# Patient Record
Sex: MALE | Race: WHITE | NOT HISPANIC OR LATINO | URBAN - METROPOLITAN AREA
[De-identification: names, ages, dates, MRNs, and addresses within clinical notes are randomized per-mention and may not be internally consistent; named-entity substitution may affect disease eponyms.]

---

## 2018-04-03 ENCOUNTER — OUTPATIENT (OUTPATIENT)
Dept: OUTPATIENT SERVICES | Facility: HOSPITAL | Age: 73
LOS: 1 days | Discharge: HOME | End: 2018-04-03

## 2018-04-03 DIAGNOSIS — Z01.810 ENCOUNTER FOR PREPROCEDURAL CARDIOVASCULAR EXAMINATION: ICD-10-CM

## 2018-04-03 DIAGNOSIS — I10 ESSENTIAL (PRIMARY) HYPERTENSION: ICD-10-CM

## 2020-01-28 PROBLEM — Z00.00 ENCOUNTER FOR PREVENTIVE HEALTH EXAMINATION: Status: ACTIVE | Noted: 2020-01-28

## 2024-05-24 ENCOUNTER — APPOINTMENT (OUTPATIENT)
Dept: ORTHOPEDIC SURGERY | Facility: CLINIC | Age: 79
End: 2024-05-24

## 2024-09-27 NOTE — ASU PATIENT PROFILE, ADULT - NSICDXPASTMEDICALHX_GEN_ALL_CORE_FT
PAST MEDICAL HISTORY:  GERD (gastroesophageal reflux disease)     History of Morales's esophagus     Hypertension

## 2024-09-27 NOTE — ASU PATIENT PROFILE, ADULT - HISTORY OF COVID-19 VACCINATION
[facility])        [address] [facility] Orthopedic Surgery Call in 1 day For follow-up within 2-3 days  [address] [id number] [facility], [address] [address] [place], [place], [address] [facility] Emergency Department Emergency Medicine Go to  Immediately for any new or worsening symptoms  [address] [facility] [id number]  [address] [place] [address] ED, [address] [place], [place], [address]          Krista Gill DO  07/21/19 1359 Yes

## 2024-09-30 ENCOUNTER — OUTPATIENT (OUTPATIENT)
Dept: OUTPATIENT SERVICES | Facility: HOSPITAL | Age: 79
LOS: 1 days | Discharge: HOME | End: 2024-09-30

## 2024-09-30 VITALS
SYSTOLIC BLOOD PRESSURE: 149 MMHG | DIASTOLIC BLOOD PRESSURE: 85 MMHG | OXYGEN SATURATION: 95 % | TEMPERATURE: 97 F | RESPIRATION RATE: 16 BRPM | HEART RATE: 61 BPM

## 2024-09-30 VITALS
TEMPERATURE: 98 F | HEIGHT: 68 IN | SYSTOLIC BLOOD PRESSURE: 154 MMHG | OXYGEN SATURATION: 97 % | RESPIRATION RATE: 16 BRPM | HEART RATE: 61 BPM | DIASTOLIC BLOOD PRESSURE: 74 MMHG | WEIGHT: 209.44 LBS

## 2024-09-30 DIAGNOSIS — Z96.652 PRESENCE OF LEFT ARTIFICIAL KNEE JOINT: Chronic | ICD-10-CM

## 2024-09-30 DIAGNOSIS — Z98.890 OTHER SPECIFIED POSTPROCEDURAL STATES: Chronic | ICD-10-CM

## 2024-09-30 DIAGNOSIS — Z90.3 ACQUIRED ABSENCE OF STOMACH [PART OF]: Chronic | ICD-10-CM

## 2024-09-30 DEVICE — COMP SCLERAL BUCKLE NO 41: Type: IMPLANTABLE DEVICE | Site: RIGHT | Status: FUNCTIONAL

## 2024-09-30 DEVICE — IMP SIL SLEEVE STYLE 70: Type: IMPLANTABLE DEVICE | Site: RIGHT | Status: FUNCTIONAL

## 2024-09-30 DEVICE — LASER PROBE 25G CONSTELLATION: Type: IMPLANTABLE DEVICE | Site: RIGHT | Status: FUNCTIONAL

## 2024-09-30 DEVICE — PERFLUORON 7ML KIT: Type: IMPLANTABLE DEVICE | Site: RIGHT | Status: FUNCTIONAL

## 2024-09-30 RX ORDER — PHENYLEPHRINE HYDROCHLORIDE 100 MG/ML
1 SOLUTION/ DROPS OPHTHALMIC
Refills: 0 | Status: COMPLETED | OUTPATIENT
Start: 2024-09-30 | End: 2024-09-30

## 2024-09-30 RX ORDER — TIZANIDINE HYDROCHLORIDE 6 MG/1
2 CAPSULE ORAL
Refills: 0 | DISCHARGE

## 2024-09-30 RX ORDER — DILTIAZEM HCL 300 MG
0 CAPSULE, EXTENDED RELEASE 24HR ORAL
Refills: 0 | DISCHARGE

## 2024-09-30 RX ORDER — DILTIAZEM HCL 300 MG
1 CAPSULE, EXTENDED RELEASE 24HR ORAL
Refills: 0 | DISCHARGE

## 2024-09-30 RX ORDER — CELECOXIB 200 MG/1
1 CAPSULE ORAL
Refills: 0 | DISCHARGE

## 2024-09-30 RX ORDER — FENTANYL CITRATE-0.9 % NACL/PF 300MCG/30
25 PATIENT CONTROLLED ANALGESIA VIAL INJECTION
Refills: 0 | Status: DISCONTINUED | OUTPATIENT
Start: 2024-09-30 | End: 2024-09-30

## 2024-09-30 RX ORDER — FAMOTIDINE 40 MG
1 TABLET ORAL
Refills: 0 | DISCHARGE

## 2024-09-30 RX ORDER — ATORVASTATIN CALCIUM 10 MG/1
1 TABLET, FILM COATED ORAL
Refills: 0 | DISCHARGE

## 2024-09-30 RX ORDER — ASPIRIN 325 MG
0 TABLET ORAL
Refills: 0 | DISCHARGE

## 2024-09-30 RX ORDER — SODIUM CHLORIDE IRRIG SOLUTION 0.9 %
1000 SOLUTION, IRRIGATION IRRIGATION
Refills: 0 | Status: DISCONTINUED | OUTPATIENT
Start: 2024-09-30 | End: 2024-09-30

## 2024-09-30 RX ORDER — TAMSULOSIN HCL 0.4 MG
1 CAPSULE ORAL
Refills: 0 | DISCHARGE

## 2024-09-30 RX ORDER — ATROPINE SULFATE 10 MG/ML
1 SOLUTION/ DROPS OPHTHALMIC
Refills: 0 | Status: COMPLETED | OUTPATIENT
Start: 2024-09-30 | End: 2024-09-30

## 2024-09-30 RX ORDER — OFLOXACIN 3 MG/ML
1 SOLUTION/ DROPS OPHTHALMIC
Refills: 0 | Status: COMPLETED | OUTPATIENT
Start: 2024-09-30 | End: 2024-09-30

## 2024-09-30 RX ORDER — TROPICAMIDE 1 %
1 DROPS OPHTHALMIC (EYE)
Refills: 0 | Status: COMPLETED | OUTPATIENT
Start: 2024-09-30 | End: 2024-09-30

## 2024-09-30 RX ADMIN — PHENYLEPHRINE HYDROCHLORIDE 1 DROP(S): 100 SOLUTION/ DROPS OPHTHALMIC at 15:20

## 2024-09-30 RX ADMIN — ATROPINE SULFATE 1 DROP(S): 10 SOLUTION/ DROPS OPHTHALMIC at 15:25

## 2024-09-30 RX ADMIN — Medication 1 DROP(S): at 15:25

## 2024-09-30 RX ADMIN — Medication 1 DROP(S): at 15:15

## 2024-09-30 RX ADMIN — PHENYLEPHRINE HYDROCHLORIDE 1 DROP(S): 100 SOLUTION/ DROPS OPHTHALMIC at 15:25

## 2024-09-30 RX ADMIN — ATROPINE SULFATE 1 DROP(S): 10 SOLUTION/ DROPS OPHTHALMIC at 15:15

## 2024-09-30 RX ADMIN — OFLOXACIN 1 DROP(S): 3 SOLUTION/ DROPS OPHTHALMIC at 15:15

## 2024-09-30 RX ADMIN — ATROPINE SULFATE 1 DROP(S): 10 SOLUTION/ DROPS OPHTHALMIC at 15:20

## 2024-09-30 RX ADMIN — OFLOXACIN 1 DROP(S): 3 SOLUTION/ DROPS OPHTHALMIC at 15:20

## 2024-09-30 RX ADMIN — PHENYLEPHRINE HYDROCHLORIDE 1 DROP(S): 100 SOLUTION/ DROPS OPHTHALMIC at 15:15

## 2024-09-30 RX ADMIN — Medication 1 DROP(S): at 15:20

## 2024-09-30 RX ADMIN — OFLOXACIN 1 DROP(S): 3 SOLUTION/ DROPS OPHTHALMIC at 15:25

## 2024-09-30 NOTE — OPERATIVE REPORT - OPERATIVE RPOSRT DETAILS
VITRECTOMY for a Primary Retinal Detachment    PATIENT NAME: JANIS ALONSO    ATTENDING: Darío Blackmon MD    PREOPERATIVE DIAGNOSIS(ES):  Rhegmatogenous Retinal Detachment, right eye     POSTOPERATIVE DIAGNOSIS(ES): Rhegmatogenous Retinal Detachment, right eye     PROCEDURE: Pars plana vitrectomy, scleral buckle, endolaser, air-fluid exchange, air-gas exchange, all of the right eye     INDICATIONS:       The patient is a 79y year old Male with a history of a retinal detachment in the operative eye. After a detailed review of the risks, benefits and alternatives of the procedure, including but not limited to bleeding, infection, inflammation, retinal detachment, loss of vision, loss of eye, double vision, need for further surgery, and systemic risks of anesthesia and surgery, informed consent was obtained and the patient elected to proceed with the surgery.     PROCEDURE:       On the day of surgery, after clearance by medicine and anesthesia, the patient was brought to the operating room in stable condition. After verifying the correct surgical site, the patient was placed in the supine position. Intravenous sedation was provided by the anesthesia team.  After a brief time-out, a 1:1 mixture of 2% lidocaine and 0.75% Marcaine was injected in a retrobulbar fashion behind the operative eye. The patient was then prepped and draped in the usual sterile fashion.  Eyelashes were draped out of the operative field and a stainless steel eyelid speculum was placed in the operative eye.  A time-out was performed verifying correct patient, procedure, site, positioning and special equipment prior to starting the case.     A 360 degree conjunctival peritomy was created using blunt Phuong scissors and a 0.12 forceps approximately 2 mm posterior to the limbus. A radial relaxing incision was created inferotemporally. Curved Zion’s tenotomy scissors were then used to enter Tenon’s space in each of the exposed quadrants. The intermuscular septa were further dissected. Using a 2-0 silk passed through a Breanna retinal detachment hook, each of the exposed rectus muscles was isolated and tied. The four quadrants were then inspected for signs of scleral thinning.  The 41 band buckle was passed 360 underneath the EOM. A 5-0 nylon mattress suture on a spatulated needle was used to place one-half thickness intrascleral bites in a horizontal mattress fashion to secure the scleral buckle in place in each quadrant. A 70 sleeve was place to secure the buckle and tightened.     A 25-gauge microcannula was placed in the inferotemporal quadrant in a beveled fashion 4 mm posterior to the limbus, as the patient was phakic. The infusion cannula was cleared of air, inserted into the microcannula, confirmed to be in the correct position within the vitreous cavity by direct visualization through the dilated pupil with the light pipe and then turned on under direct visualization. Two additional microcannulas were placed superonasally and superotemporally in a similar fashion. The vitrectomy hand piece and light were then inserted into the eye and the anterior vitreous was cleared under direct visualization.     Then, under indirect wide field visualization, a core and peripheral vitrectomy was performed. The posterior hyaloid was  using active aspiration and Kenalog assistance.  The vitreous was excised 360 degrees to the posterior vitreous base and no additional retinal breaks were noted. Perfluorocarbon liquid was placed posteriorly to stabilize the retina.  The retina was noted to flatten nicely.   Endophotocoagulation was then placed 360 degrees posterior to the buckle.     A backflush was then used to perform an air-fluid exchange, taking care to drain the subretinal fluid from the most posterior break. Following the air-fluid exchange, additional laser was placed on the anterior aspect with endophotocoagulation.       Following the air-fluid exchange, the superonasal microcannula was removed and the sclerotomies were sutured closed using 8-0 vicryl. 11 % C3F8 gas was drawn up by the attending physician and an air-gas exchange was performed. The remaining microcannulas were removed from the eye and the sclerotomies closed using the 8-0 vicryl in an interrupted fashion. All sclerotomies remained airtight and the eye remained at a physiologic pressure by palpation.     Tenon’s capsule was closed over the scleral buckle element using 8-0 vicryl in an interrupted fashion. The overlying conjunctiva was then closed using running and interrupted 8-0 vicryl sutures.  The eye remained at a physiologic pressure by palpation.     Ancef and dex was injected in the subconjunctival space.    The eyelid speculum was removed from the eye. Betadine was cleaned from around the eye. A topical steroid/antibiotic cream was placed on the eye, followed by a patch and a clear plastic shield. The patient tolerated the procedure well and left the operating room in stable condition.

## 2024-09-30 NOTE — OPERATIVE REPORT - NSICDXBRIEFPROCEDURE_GEN_ALL_CORE_FT
PROCEDURES:  Vitrectomy, pars plana approach, with scleral buckling 30-Sep-2024 19:24:41  Reynold Allen

## 2025-02-27 ENCOUNTER — APPOINTMENT (OUTPATIENT)
Age: 80
End: 2025-02-27
Payer: MEDICARE

## 2025-02-27 DIAGNOSIS — B35.1 TINEA UNGUIUM: ICD-10-CM

## 2025-02-27 DIAGNOSIS — L60.2 ONYCHOGRYPHOSIS: ICD-10-CM

## 2025-02-27 DIAGNOSIS — M20.40 OTHER HAMMER TOE(S) (ACQUIRED), UNSPECIFIED FOOT: ICD-10-CM

## 2025-02-27 DIAGNOSIS — M79.674 TINEA UNGUIUM: ICD-10-CM

## 2025-02-27 DIAGNOSIS — M79.675 TINEA UNGUIUM: ICD-10-CM

## 2025-02-27 PROCEDURE — 11721 DEBRIDE NAIL 6 OR MORE: CPT

## 2025-02-27 PROCEDURE — 99213 OFFICE O/P EST LOW 20 MIN: CPT | Mod: 25

## 2025-02-28 PROBLEM — M20.40 ACQUIRED HAMMER TOE: Status: ACTIVE | Noted: 2025-02-28

## 2025-02-28 PROBLEM — L60.2 ONYCHOGRYPHOSIS: Status: ACTIVE | Noted: 2025-02-28

## 2025-02-28 PROBLEM — B35.1 ONYCHOMYCOSIS OF TOENAIL: Status: ACTIVE | Noted: 2025-02-28

## 2025-02-28 PROBLEM — B35.1 PAIN DUE TO ONYCHOMYCOSIS OF TOENAILS OF BOTH FEET: Status: ACTIVE | Noted: 2025-02-28

## 2025-05-08 ENCOUNTER — APPOINTMENT (OUTPATIENT)
Age: 80
End: 2025-05-08
Payer: MEDICARE

## 2025-05-08 DIAGNOSIS — L60.2 ONYCHOGRYPHOSIS: ICD-10-CM

## 2025-05-08 DIAGNOSIS — B35.1 TINEA UNGUIUM: ICD-10-CM

## 2025-05-08 DIAGNOSIS — M79.674 TINEA UNGUIUM: ICD-10-CM

## 2025-05-08 DIAGNOSIS — M79.671 PAIN IN RIGHT FOOT: ICD-10-CM

## 2025-05-08 DIAGNOSIS — M79.672 PAIN IN LEFT FOOT: ICD-10-CM

## 2025-05-08 DIAGNOSIS — M79.675 TINEA UNGUIUM: ICD-10-CM

## 2025-05-08 PROCEDURE — 11721 DEBRIDE NAIL 6 OR MORE: CPT

## 2025-05-08 PROCEDURE — 99213 OFFICE O/P EST LOW 20 MIN: CPT | Mod: 25

## 2025-09-09 ENCOUNTER — APPOINTMENT (OUTPATIENT)
Age: 80
End: 2025-09-09
Payer: MEDICARE

## 2025-09-09 DIAGNOSIS — M79.672 PAIN IN LEFT FOOT: ICD-10-CM

## 2025-09-09 DIAGNOSIS — B35.1 TINEA UNGUIUM: ICD-10-CM

## 2025-09-09 DIAGNOSIS — L60.2 ONYCHOGRYPHOSIS: ICD-10-CM

## 2025-09-09 DIAGNOSIS — M79.671 PAIN IN RIGHT FOOT: ICD-10-CM

## 2025-09-09 PROCEDURE — 99213 OFFICE O/P EST LOW 20 MIN: CPT | Mod: 25

## 2025-09-09 PROCEDURE — 11721 DEBRIDE NAIL 6 OR MORE: CPT

## (undated) DEVICE — BACKFLUSH SOFT TIP 25G

## (undated) DEVICE — CANNULA BLUNT TIP 25GA

## (undated) DEVICE — VENODYNE/SCD SLEEVE CALF MEDIUM

## (undated) DEVICE — PACK VITRECTOMY  LF

## (undated) DEVICE — CANNULA ALCON SOFT TIP 25G

## (undated) DEVICE — SUT MERSILENE 5-0 18" S-14

## (undated) DEVICE — CANNULA MEDONE DUAL BORE SIDEFLO 25G

## (undated) DEVICE — Device

## (undated) DEVICE — GLV 7.5 PROTEXIS (WHITE)

## (undated) DEVICE — APPLICATOR COTTON TIP 3" STERILE

## (undated) DEVICE — ELCTR WETFIELD ERASER FINE TIP 25GA

## (undated) DEVICE — SUT SILK 3-0 18" TIES

## (undated) DEVICE — WARMING BLANKET LOWER ADULT

## (undated) DEVICE — ILM RESOLUTION FORCEP 25G DISP

## (undated) DEVICE — CANNULA SURGICAL SPECIALTIES DUAL BORE 25G

## (undated) DEVICE — DRAPE MICROSCOPE KNOB COVER SMALL (2 PCS)